# Patient Record
Sex: MALE | Race: OTHER | NOT HISPANIC OR LATINO | URBAN - METROPOLITAN AREA
[De-identification: names, ages, dates, MRNs, and addresses within clinical notes are randomized per-mention and may not be internally consistent; named-entity substitution may affect disease eponyms.]

---

## 2020-06-09 ENCOUNTER — EMERGENCY (EMERGENCY)
Facility: HOSPITAL | Age: 35
LOS: 1 days | Discharge: HOME | End: 2020-06-09
Attending: EMERGENCY MEDICINE | Admitting: EMERGENCY MEDICINE
Payer: SELF-PAY

## 2020-06-09 VITALS
OXYGEN SATURATION: 100 % | HEART RATE: 66 BPM | RESPIRATION RATE: 18 BRPM | TEMPERATURE: 98 F | SYSTOLIC BLOOD PRESSURE: 111 MMHG | DIASTOLIC BLOOD PRESSURE: 59 MMHG

## 2020-06-09 PROCEDURE — 99283 EMERGENCY DEPT VISIT LOW MDM: CPT | Mod: 25

## 2020-06-09 PROCEDURE — 10060 I&D ABSCESS SIMPLE/SINGLE: CPT

## 2020-06-09 RX ORDER — ACETAMINOPHEN 500 MG
975 TABLET ORAL ONCE
Refills: 0 | Status: COMPLETED | OUTPATIENT
Start: 2020-06-09 | End: 2020-06-09

## 2020-06-09 RX ORDER — IBUPROFEN 200 MG
600 TABLET ORAL ONCE
Refills: 0 | Status: COMPLETED | OUTPATIENT
Start: 2020-06-09 | End: 2020-06-09

## 2020-06-09 RX ADMIN — Medication 975 MILLIGRAM(S): at 19:29

## 2020-06-09 RX ADMIN — Medication 600 MILLIGRAM(S): at 19:29

## 2020-06-09 NOTE — ED PROVIDER NOTE - CLINICAL SUMMARY MEDICAL DECISION MAKING FREE TEXT BOX
34 M w/ no sig PMH presenting w/ R buttock pain. Pain is hx and exam, symptoms consistent w/ cutaneous abscess. Fluctuance and induration do NOT extend towards rectum. Will do bed side I&D. Pt reports having been given f/u information by his dermatologist. Tylenol/Motrin for pain. Plan on DC after I&D. Will reassess the need for additional interventions as clinically warranted.

## 2020-06-09 NOTE — ED PROVIDER NOTE - NSFOLLOWUPINSTRUCTIONS_ED_ALL_ED_FT
Abscess    An abscess is an infected area that contains a collection of pus and debris. It can occur in almost any part of the body and occurs when the tissue gets infection. Symptoms include a painful mass that is red, warm, tender that might break open and HAVE drainage. If your health care provider gave you antibiotics make sure to take the full course and do not stop even if feeling better.     SEEK IMMEDIATE MEDICAL CARE IF YOU HAVE ANY OF THE FOLLOWING SYMPTOMS: chills, fever, muscle aches, or red streaking from the area.     1) Follow up with your doctor in 48 hours  2) Return to the ED immediately for new or worsening symptoms   3) Please continue to take any home medications as prescribed  4) Please use warm compresses on your abscess to help encourage drainage  5) Please take 1 g of Tylenol every 6 hours as needed for pain. Do not exceed more than 4 g in a 24 hour period. You were given 975 mg in the ED.  6) Please take 600 mg of Motrin every 6 hours as needed for pain. Please take this medication with food as it can upset your stomach. You were given 600 mg in the ED.

## 2020-06-09 NOTE — ED ADULT TRIAGE NOTE - CHIEF COMPLAINT QUOTE
A&OX3 c/o right buttocks discomfort x one week, pt states he was sent from urgent care for known abscess area red and swollen and sensitive to touch denies fever cough chills

## 2020-06-09 NOTE — ED PROVIDER NOTE - PHYSICAL EXAMINATION
Gen: NAD, AOx3, able to make needs known, non-toxic  Head: NCAT  HEENT: EOMI, oral mucosa moist, normal conjunctiva  Lung: CTAB, no respiratory distress, no wheezes/rhonchi/rales B/L, speaking in full sentences  CV: RRR, no murmurs  Abd: soft, NTND, no guarding, no CVA tenderness  MSK: no visible deformities  Neuro: No focal sensory or motor deficits  Skin: Warm, well perfused, R buttock: firm and indurated peripherally w/ central fluctuance lesion of approx 7x4 cm, mild overlying erythema, no spreading erythema  Psych: normal affect

## 2020-06-09 NOTE — ED PROVIDER NOTE - OBJECTIVE STATEMENT
35 y/o M w/ no sig PMH presenting w/ buttock abscess. Intake room 13. Pt reports for many years having a "pimple" on his R buttocks. For the past 10 days has developed pain and swelling at that site. Pain became so severe that he went to urgent care and dermatologist today for evaluation. Was recommended to come to ED for further care. Did not take anything for pain. Pain is sharp, non radiating. Worse w/ movement and sitting down. Improved w/ standing. Has not had pain or symptoms like this before. Denies fevers, chills, headache, dizziness, blurred vision, chest pain, cough, shortness of breath, abdominal pain, n/v/d/c, urinary symptoms, joint pain, rash.

## 2020-06-09 NOTE — ED PROVIDER NOTE - NS ED ROS FT
GENERAL: No fever or chills  EYES: No change in vision  HEENT: No trouble swallowing or speaking  CARDIAC: No chest pain  PULMONARY: No cough or SOB  GI: No abdominal pain, no nausea or no vomiting, no diarrhea or constipation  : No changes in urination  SKIN: No rashes  NEURO: No headache, no numbness  MSK: No joint pain, +R buttock pain  Otherwise as HPI or negative.

## 2020-06-09 NOTE — ED PROVIDER NOTE - PROGRESS NOTE DETAILS
Dr. Julio Chavez, PGY-2: I&D performed. Pt to f/u w/ referral from derm. Advised to have wound evaluated in 48 hours at PCP, derm, urgent care, or ED. Return precautions provided, pt verbalized understanding. Ready for DC.

## 2020-06-09 NOTE — ED PROCEDURE NOTE - ATTENDING CONTRIBUTION TO CARE
MD RAZA:  I was present for the critical portions of this procedure and provided direct attending supervision.

## 2020-06-09 NOTE — ED PROVIDER NOTE - PATIENT PORTAL LINK FT
You can access the FollowMyHealth Patient Portal offered by [facility] by registering at the following website: [email or web address]. By joining [id number] [employer]’s FollowMyHealth portal, you will also be able to view your health information using other applications (apps) compatible with our system.

## 2020-06-09 NOTE — ED PROVIDER NOTE - ATTENDING CONTRIBUTION TO CARE
DR. RAZA, ATTENDING MD-  I performed a face to face bedside interview with the patient regarding history of present illness, review of symptoms and past medical history. I completed an independent physical exam.  I have discussed the patient's plan of care with the resident.   Documentation as above in the note.    33 y/o male with right buttock pain and swelling.  States has known pimple there for few years but lately more swelling and pain.  Saw dermatologist who recommended he go to ED for I&D.  No prior abscesses in past.  Right buttock with discrete swelling and fluctuance, does not extend towards rectum.  Abscess.  Will I&D.  Discharge with pcp and derm f/u as o/p.

## 2020-06-19 ENCOUNTER — EMERGENCY (EMERGENCY)
Facility: HOSPITAL | Age: 35
LOS: 1 days | Discharge: HOME | End: 2020-06-19
Attending: EMERGENCY MEDICINE | Admitting: EMERGENCY MEDICINE
Payer: SELF-PAY

## 2020-06-19 VITALS
HEART RATE: 64 BPM | RESPIRATION RATE: 18 BRPM | OXYGEN SATURATION: 100 % | TEMPERATURE: 99 F | SYSTOLIC BLOOD PRESSURE: 100 MMHG | DIASTOLIC BLOOD PRESSURE: 60 MMHG

## 2020-06-19 PROCEDURE — 10060 I&D ABSCESS SIMPLE/SINGLE: CPT | Mod: 78

## 2020-06-19 PROCEDURE — 99282 EMERGENCY DEPT VISIT SF MDM: CPT | Mod: 25

## 2020-06-19 NOTE — ED PROVIDER NOTE - PATIENT PORTAL LINK FT
You can access the FollowMyHealth Patient Portal offered by [facility] by registering at the following website: [email or web address]. By joining [employer]’s FollowMyHealth portal, you will also be able to view your health information using other applications (apps) compatible with our system.

## 2020-06-19 NOTE — ED PROVIDER NOTE - CLINICAL SUMMARY MEDICAL DECISION MAKING FREE TEXT BOX
35 y/o M recurrent abscess to right buttocks. I&D one more time. DC with antibiotics and f/u derm with clinic provided.

## 2020-06-19 NOTE — ED ADULT TRIAGE NOTE - CHIEF COMPLAINT QUOTE
r buttock swelling. states seen in ed 1 wk ago,drainage done. was to return for follow up. pt states area enlarged with increased pain. denies fever

## 2020-06-19 NOTE — ED PROVIDER NOTE - PHYSICAL EXAMINATION
localized fluctuant abscess on right mid buttocks about 3cm to 3cm and TPP, negative streaking, no bullae

## 2020-06-19 NOTE — ED PROVIDER NOTE - OBJECTIVE STATEMENT
33 y/o M hx of recent abscess drainage 8 days ago here at [facility] ED presents with right buttocks reaccumulation of infection. States improved pain after I&D 8 days ago and pain returned 2 days ago, Denies fever, chills or other symptoms in signs of infection. Able to ambulate. No pain with defecation.

## 2020-06-20 PROBLEM — Z78.9 OTHER SPECIFIED HEALTH STATUS: Chronic | Status: ACTIVE | Noted: 2020-06-09

## 2025-05-07 NOTE — ED PROVIDER NOTE - MUSCULOSKELETAL, MLM
pt c/o right toe pain. hx of ingrowing nail
Spine appears normal, range of motion is not limited, no muscle or joint tenderness